# Patient Record
(demographics unavailable — no encounter records)

---

## 2024-10-25 NOTE — HISTORY OF PRESENT ILLNESS
[FreeTextEntry1] : Pt is a 65 y/o F PMH HTN, HLD, low HDL, BMI 40, mod plaque R carotid bulb, SANGEETHA on CPAP.   Family hx: mother HTN/HLD/CVA 72, father HTN/HLD/PVD/MI/CABG 50's, sister HTN  She tells me that her father passed away 09/2024, she is under a lot of stress with his estate.  For the past week she has been getting chest pressure and SOB.  Symptoms worsen with emotional stress, not associated with exertion, notes some upper extremity tightness.  She denies diaphoresis, palpitations, dizziness, syncope, LE edema, PND, orthopnea.   TTE 03/2021 EF 65-70% without significant valvular disease CUS 04/2021 mod plaques R carotid bulb nuclear stress test 05/2021 normal myocardial perfusion, EF 69%  PMH: HTN, HLD, low HDL, hypothyroidism, depression, BMI 37 Family hx: mother HTN/HLD/CVA 72, father HTN/HLD/PVD/MI/CABG 50's, sister HTN Smoking status: quit 50 social ETOH no drug use Current exercise: none Daily water intake: 60 oz Daily caffeine intake: 1-2 cups coffee OTC medications: advil PRN, ASA PRN NKDA Previous hospitalizations: shoulder surgery, hip surgery 03/2022, knee surgery 2022 - no problems with anesthesia 1 child - no problem with pregnancies

## 2024-10-25 NOTE — DISCUSSION/SUMMARY
[EKG obtained to assist in diagnosis and management of assessed problem(s)] : EKG obtained to assist in diagnosis and management of assessed problem(s) [FreeTextEntry1] : Pt is a 67 y/o F PMH HTN, HLD, low HDL, BMI 39, mod plaque R carotid bulb, SANGEETHA on CPAP.  Now with chest pressure and SOB repeat TTE check CCTA  check labs Advised pt to go to the nearest ED if symptoms persist or worsen   LEIGH: c/w statin, goal LDL <70 c/w ASA will monitor  HLD: c/w crestor Advised lifestyle modifications   HTN: well controlled c/w amlodipine/olmesartan 5-20 qd c/w triam/HCTZ 75/50 qd Discussed the long-term health risks of uncontrolled BP.  Advised low salt diet, regular exercise, maintaining ideal weight. Encouraged pt to check BP at home and keep journal   The described plan was discussed with the pt.  All questions and concerns were addressed to the best of my knowledge.